# Patient Record
Sex: MALE | Race: WHITE
[De-identification: names, ages, dates, MRNs, and addresses within clinical notes are randomized per-mention and may not be internally consistent; named-entity substitution may affect disease eponyms.]

---

## 2019-03-17 ENCOUNTER — HOSPITAL ENCOUNTER (INPATIENT)
Dept: HOSPITAL 95 - ER | Age: 59
LOS: 4 days | Discharge: LEFT BEFORE DISCHARGE | DRG: 442 | End: 2019-03-21
Attending: HOSPITALIST | Admitting: HOSPITALIST
Payer: COMMERCIAL

## 2019-03-17 VITALS — WEIGHT: 308.43 LBS | HEIGHT: 70.98 IN | BODY MASS INDEX: 43.18 KG/M2

## 2019-03-17 DIAGNOSIS — I95.9: ICD-10-CM

## 2019-03-17 DIAGNOSIS — K74.60: ICD-10-CM

## 2019-03-17 DIAGNOSIS — N39.0: ICD-10-CM

## 2019-03-17 DIAGNOSIS — E66.01: ICD-10-CM

## 2019-03-17 DIAGNOSIS — E86.0: ICD-10-CM

## 2019-03-17 DIAGNOSIS — R78.81: ICD-10-CM

## 2019-03-17 DIAGNOSIS — K21.9: ICD-10-CM

## 2019-03-17 DIAGNOSIS — D69.6: ICD-10-CM

## 2019-03-17 DIAGNOSIS — K72.00: Primary | ICD-10-CM

## 2019-03-17 DIAGNOSIS — F17.210: ICD-10-CM

## 2019-03-17 DIAGNOSIS — B19.20: ICD-10-CM

## 2019-03-17 DIAGNOSIS — B95.5: ICD-10-CM

## 2019-03-17 DIAGNOSIS — E87.6: ICD-10-CM

## 2019-03-17 DIAGNOSIS — F32.9: ICD-10-CM

## 2019-03-17 LAB
ALBUMIN SERPL BCP-MCNC: 3.6 G/DL (ref 3.4–5)
ALBUMIN/GLOB SERPL: 0.6 {RATIO} (ref 0.8–1.8)
ALT SERPL W P-5'-P-CCNC: 47 U/L (ref 12–78)
ANION GAP SERPL CALCULATED.4IONS-SCNC: 9 MMOL/L (ref 6–16)
AST SERPL W P-5'-P-CCNC: 134 U/L (ref 12–37)
BASOPHILS # BLD AUTO: 0.12 K/MM3 (ref 0–0.23)
BASOPHILS NFR BLD AUTO: 1 % (ref 0–2)
BILIRUB SERPL-MCNC: 2.8 MG/DL (ref 0.1–1)
BUN SERPL-MCNC: 32 MG/DL (ref 8–24)
CALCIUM SERPL-MCNC: 9.3 MG/DL (ref 8.5–10.1)
CHLORIDE SERPL-SCNC: 105 MMOL/L (ref 98–108)
CO2 SERPL-SCNC: 28 MMOL/L (ref 21–32)
CREAT SERPL-MCNC: 0.96 MG/DL (ref 0.6–1.2)
DEPRECATED RDW RBC AUTO: 59.4 FL (ref 35.1–46.3)
EOSINOPHIL # BLD AUTO: 0.19 K/MM3 (ref 0–0.68)
EOSINOPHIL NFR BLD AUTO: 1 % (ref 0–6)
ERYTHROCYTE [DISTWIDTH] IN BLOOD BY AUTOMATED COUNT: 15.1 % (ref 11.7–14.2)
GLOBULIN SER CALC-MCNC: 6.3 G/DL (ref 2.2–4)
GLUCOSE SERPL-MCNC: 107 MG/DL (ref 70–99)
HCT VFR BLD AUTO: 45.8 % (ref 37–53)
HGB BLD-MCNC: 15.9 G/DL (ref 13.5–17.5)
IMM GRANULOCYTES # BLD AUTO: 0.04 K/MM3 (ref 0–0.1)
IMM GRANULOCYTES NFR BLD AUTO: 0 % (ref 0–1)
LYMPHOCYTES # BLD AUTO: 3.31 K/MM3 (ref 0.84–5.2)
LYMPHOCYTES NFR BLD AUTO: 24 % (ref 21–46)
MAGNESIUM SERPL-MCNC: 2.1 MG/DL (ref 1.6–2.4)
MCHC RBC AUTO-ENTMCNC: 34.7 G/DL (ref 31.5–36.5)
MCV RBC AUTO: 105 FL (ref 80–100)
MONOCYTES # BLD AUTO: 1.22 K/MM3 (ref 0.16–1.47)
MONOCYTES NFR BLD AUTO: 9 % (ref 4–13)
NEUTROPHILS # BLD AUTO: 9.05 K/MM3 (ref 1.96–9.15)
NEUTROPHILS NFR BLD AUTO: 65 % (ref 41–73)
NRBC # BLD AUTO: 0 K/MM3 (ref 0–0.02)
NRBC BLD AUTO-RTO: 0 /100 WBC (ref 0–0.2)
PLATELET # BLD AUTO: 120 K/MM3 (ref 150–400)
POTASSIUM SERPL-SCNC: 3.7 MMOL/L (ref 3.5–5.5)
PROT SERPL-MCNC: 9.9 G/DL (ref 6.4–8.2)
PROTHROMBIN TIME: 14.6 SEC (ref 9.7–11.5)
SODIUM SERPL-SCNC: 142 MMOL/L (ref 136–145)

## 2019-03-18 LAB
ALBUMIN SERPL BCP-MCNC: 3.1 G/DL (ref 3.4–5)
ALBUMIN/GLOB SERPL: 0.6 {RATIO} (ref 0.8–1.8)
ALT SERPL W P-5'-P-CCNC: 41 U/L (ref 12–78)
AMPHETAMINES UR SCN-MCNC: DETECTED NG/ML
AMPHETAMINES UR-MCNC: DETECTED UG/L
ANION GAP SERPL CALCULATED.4IONS-SCNC: 8 MMOL/L (ref 6–16)
AST SERPL W P-5'-P-CCNC: 146 U/L (ref 12–37)
BILIRUB SERPL-MCNC: 1.9 MG/DL (ref 0.1–1)
BUN SERPL-MCNC: 31 MG/DL (ref 8–24)
CALCIUM SERPL-MCNC: 8.8 MG/DL (ref 8.5–10.1)
CHLORIDE SERPL-SCNC: 108 MMOL/L (ref 98–108)
CO2 SERPL-SCNC: 29 MMOL/L (ref 21–32)
CREAT SERPL-MCNC: 0.98 MG/DL (ref 0.6–1.2)
DEPRECATED RDW RBC AUTO: 59.2 FL (ref 35.1–46.3)
ERYTHROCYTE [DISTWIDTH] IN BLOOD BY AUTOMATED COUNT: 14.9 % (ref 11.7–14.2)
GLOBULIN SER CALC-MCNC: 5.3 G/DL (ref 2.2–4)
GLUCOSE SERPL-MCNC: 88 MG/DL (ref 70–99)
HCT VFR BLD AUTO: 43.5 % (ref 37–53)
HGB BLD-MCNC: 14.8 G/DL (ref 13.5–17.5)
KETONES UR STRIP-MCNC: (no result) MG/DL
LEUKOCYTE ESTERASE UR QL STRIP: (no result)
MCHC RBC AUTO-ENTMCNC: 34 G/DL (ref 31.5–36.5)
MCV RBC AUTO: 106 FL (ref 80–100)
NRBC # BLD AUTO: 0 K/MM3 (ref 0–0.02)
NRBC BLD AUTO-RTO: 0 /100 WBC (ref 0–0.2)
PLATELET # BLD AUTO: 112 K/MM3 (ref 150–400)
POTASSIUM SERPL-SCNC: 3.4 MMOL/L (ref 3.5–5.5)
PROT SERPL-MCNC: 8.4 G/DL (ref 6.4–8.2)
PROT UR STRIP-MCNC: (no result) MG/DL
RBC #/AREA URNS HPF: (no result) /HPF (ref 0–2)
SODIUM SERPL-SCNC: 145 MMOL/L (ref 136–145)
SP GR SPEC: 1.02 (ref 1–1.02)
UROBILINOGEN UR STRIP-MCNC: (no result) MG/DL
WBC #/AREA URNS HPF: (no result) /HPF (ref 0–5)

## 2019-03-18 NOTE — NUR
PER S.O. PATIENT VOICE SOUNDING BETTER. ALERT AND ORIENTED. ONE PERSON ASSIST.
UNLABORED RESPIRATIONS. INTERMITTENT WHEEZES THAT CLEAR WITH COUGHING. IV
PATENT W/FLUIDS RUNNING. ABLE TO MAKE NEEDS KNOWN. WILL CONTINUE TO MONITOR.

## 2019-03-18 NOTE — NUR
SHIFT SUMMARY
PT WAS A NEW ADMIT DURING THE NIGHT, ADMITTED FOR HEPATIC ENCEPHALOPATHY. THE
PT WAS ADMITTED AFTER HE WAS FOUND WANDERING THE HOSPITAL AFTER HIS WIFE
DROPPED HIM OFF AT THE ER. PT IS A&O X 2-3, THOUGH CAN BE FORGETFUL, AND DOES
NOT REMEMBER WHAT HAPPENED AFTER HE ARRIVED AT THE HOSPITAL.  HE DENIED
ANY COMPLAINTS OF PAIN, NAUSEA OR SOB. PT DOES HAVE YELLOWING OF THE SCLERA,
WITH A HX OF LIVER CIRRHOSIS AND HEPATITIS C PER THE PT. VITAL SIGNS STABLE.
NO OTHER ACUTE CHANGES IN PT CONDITION NOTED. WILL CONTINUE TO MONITOR AND
TREAT PER EMAR.

## 2019-03-19 LAB
ALBUMIN SERPL BCP-MCNC: 2.7 G/DL (ref 3.4–5)
ANION GAP SERPL CALCULATED.4IONS-SCNC: 5 MMOL/L (ref 6–16)
BASOPHILS # BLD AUTO: 0.11 K/MM3 (ref 0–0.23)
BASOPHILS NFR BLD AUTO: 1 % (ref 0–2)
BUN SERPL-MCNC: 19 MG/DL (ref 8–24)
CALCIUM SERPL-MCNC: 7.7 MG/DL (ref 8.5–10.1)
CHLORIDE SERPL-SCNC: 109 MMOL/L (ref 98–108)
CO2 SERPL-SCNC: 29 MMOL/L (ref 21–32)
CREAT SERPL-MCNC: 0.8 MG/DL (ref 0.6–1.2)
DEPRECATED RDW RBC AUTO: 60.8 FL (ref 35.1–46.3)
EOSINOPHIL # BLD AUTO: 0.38 K/MM3 (ref 0–0.68)
EOSINOPHIL NFR BLD AUTO: 5 % (ref 0–6)
ERYTHROCYTE [DISTWIDTH] IN BLOOD BY AUTOMATED COUNT: 15.1 % (ref 11.7–14.2)
GLUCOSE SERPL-MCNC: 96 MG/DL (ref 70–99)
HCT VFR BLD AUTO: 40.3 % (ref 37–53)
HGB BLD-MCNC: 13.6 G/DL (ref 13.5–17.5)
IMM GRANULOCYTES # BLD AUTO: 0.01 K/MM3 (ref 0–0.1)
IMM GRANULOCYTES NFR BLD AUTO: 0 % (ref 0–1)
LYMPHOCYTES # BLD AUTO: 3.09 K/MM3 (ref 0.84–5.2)
LYMPHOCYTES NFR BLD AUTO: 37 % (ref 21–46)
MCHC RBC AUTO-ENTMCNC: 33.7 G/DL (ref 31.5–36.5)
MCV RBC AUTO: 108 FL (ref 80–100)
MONOCYTES # BLD AUTO: 0.67 K/MM3 (ref 0.16–1.47)
MONOCYTES NFR BLD AUTO: 8 % (ref 4–13)
NEUTROPHILS # BLD AUTO: 4.01 K/MM3 (ref 1.96–9.15)
NEUTROPHILS NFR BLD AUTO: 49 % (ref 41–73)
NRBC # BLD AUTO: 0 K/MM3 (ref 0–0.02)
NRBC BLD AUTO-RTO: 0 /100 WBC (ref 0–0.2)
PHOSPHATE SERPL-MCNC: 2.8 MG/DL (ref 2.5–4.9)
PLATELET # BLD AUTO: 85 K/MM3 (ref 150–400)
POTASSIUM SERPL-SCNC: 3.6 MMOL/L (ref 3.5–5.5)
SODIUM SERPL-SCNC: 143 MMOL/L (ref 136–145)

## 2019-03-19 NOTE — NUR
ALERT, ORIENTED. STEADY GAIT IN ROOM TO BATHROOM. IV PATENT. HAS NOT HAD ANY
LIQUID OR SOFT STOOLS. GOOD APPETITE. UNLABORED RESPIRATIONS. BED IN LOW
POSITION. TELE ON. WILL CONTINUE TO MONITOR.

## 2019-03-19 NOTE — NUR
PATIENT ALERT, ORIENTED. APPEARS GROGGY. HAS HAD 3 SOLID, NORMAL BOWEL
MOVEMENTS TODAY. ABLE TO MAKE NEEDS KNOWN. ONE PERSON STANDBY ASSIST. WILL
CONTINUE TO MONITOR.

## 2019-03-19 NOTE — NUR
VSS, AFEBRILE, A/O BUT DELAYED RESPONSES, WIFE AT THE BEDSIDE, IVG, DARK DKIN
ON LEGS, PMHX OF HEP C, LOW BP'S, CIRRHOSIS, KACIE/OBESE, DARK SKIN ON BILAT LE.
PT SLEEPS DEEPLY AND CAN BE DIFFICULT TO AROUSE, CYST OR WART ON THE BACK OF
THE R HAND. TELE: NSR.

## 2019-03-20 LAB
ALBUMIN SERPL BCP-MCNC: 2.8 G/DL (ref 3.4–5)
ANION GAP SERPL CALCULATED.4IONS-SCNC: 6 MMOL/L (ref 6–16)
BASOPHILS # BLD AUTO: 0.1 K/MM3 (ref 0–0.23)
BASOPHILS NFR BLD AUTO: 1 % (ref 0–2)
BUN SERPL-MCNC: 12 MG/DL (ref 8–24)
CALCIUM SERPL-MCNC: 7.9 MG/DL (ref 8.5–10.1)
CHLORIDE SERPL-SCNC: 112 MMOL/L (ref 98–108)
CO2 SERPL-SCNC: 26 MMOL/L (ref 21–32)
CREAT SERPL-MCNC: 0.78 MG/DL (ref 0.6–1.2)
DEPRECATED RDW RBC AUTO: 59.6 FL (ref 35.1–46.3)
EOSINOPHIL # BLD AUTO: 0.41 K/MM3 (ref 0–0.68)
EOSINOPHIL NFR BLD AUTO: 5 % (ref 0–6)
ERYTHROCYTE [DISTWIDTH] IN BLOOD BY AUTOMATED COUNT: 15.1 % (ref 11.7–14.2)
GLUCOSE SERPL-MCNC: 108 MG/DL (ref 70–99)
HCT VFR BLD AUTO: 42.5 % (ref 37–53)
HGB BLD-MCNC: 14.4 G/DL (ref 13.5–17.5)
IMM GRANULOCYTES # BLD AUTO: 0.03 K/MM3 (ref 0–0.1)
IMM GRANULOCYTES NFR BLD AUTO: 0 % (ref 0–1)
LYMPHOCYTES # BLD AUTO: 2.39 K/MM3 (ref 0.84–5.2)
LYMPHOCYTES NFR BLD AUTO: 31 % (ref 21–46)
MCHC RBC AUTO-ENTMCNC: 33.9 G/DL (ref 31.5–36.5)
MCV RBC AUTO: 106 FL (ref 80–100)
MONOCYTES # BLD AUTO: 0.67 K/MM3 (ref 0.16–1.47)
MONOCYTES NFR BLD AUTO: 9 % (ref 4–13)
NEUTROPHILS # BLD AUTO: 4.12 K/MM3 (ref 1.96–9.15)
NEUTROPHILS NFR BLD AUTO: 53 % (ref 41–73)
NRBC # BLD AUTO: 0 K/MM3 (ref 0–0.02)
NRBC BLD AUTO-RTO: 0 /100 WBC (ref 0–0.2)
PHOSPHATE SERPL-MCNC: 2.3 MG/DL (ref 2.5–4.9)
PLATELET # BLD AUTO: 87 K/MM3 (ref 150–400)
POTASSIUM SERPL-SCNC: 3.9 MMOL/L (ref 3.5–5.5)
SODIUM SERPL-SCNC: 144 MMOL/L (ref 136–145)

## 2019-03-20 NOTE — NUR
VSS, AFEBRILE, ALERT BUT CONFUSED, FAMILY AT THE BEDSIDE, TELE: NSR, NS AT
100, PT SLEPT WELL, NO COMPLAINTS.

## 2019-03-20 NOTE — NUR
TELE D'C AND WHEN RN WENT BACK TO GET ROOM , TELE WAS ON BED AND PATIENT WENT
OUTSIDE IN W/C W/FRIENDS. GONE FOR ABOUT 20 MINUTES.

## 2019-03-20 NOTE — NUR
PATIENT ALERT AND ORIENTED, BUT SOMETIMES DOES NOT GET DATE CORRECT. DENIES
PAIN. USES BATHROOM OR BSC. REFUSED NICOTINE PATCH PAST FEW DAYS STATING "ONLY
SMOKE COUPLE OF CIGARETTES". WENT OUT WITH FRIENDS TODAY TO SMOKE. APPEARS TO
HAVE FAIRLY STEADY GAIT VERY SHORT DISTANCES. UNLABORED RESPIRATIONS. BED IN
LOW POSITION. CALL LIGHT WITHIN REACH. WILL CONTINUE TO MONITOR.

## 2019-03-21 LAB
ALBUMIN SERPL BCP-MCNC: 2.7 G/DL (ref 3.4–5)
ANION GAP SERPL CALCULATED.4IONS-SCNC: 7 MMOL/L (ref 6–16)
BASOPHILS # BLD AUTO: 0.08 K/MM3 (ref 0–0.23)
BASOPHILS NFR BLD AUTO: 1 % (ref 0–2)
BUN SERPL-MCNC: 9 MG/DL (ref 8–24)
CALCIUM SERPL-MCNC: 7.9 MG/DL (ref 8.5–10.1)
CHLORIDE SERPL-SCNC: 110 MMOL/L (ref 98–108)
CO2 SERPL-SCNC: 26 MMOL/L (ref 21–32)
CREAT SERPL-MCNC: 0.77 MG/DL (ref 0.6–1.2)
DEPRECATED RDW RBC AUTO: 59.5 FL (ref 35.1–46.3)
EOSINOPHIL # BLD AUTO: 0.44 K/MM3 (ref 0–0.68)
EOSINOPHIL NFR BLD AUTO: 5 % (ref 0–6)
ERYTHROCYTE [DISTWIDTH] IN BLOOD BY AUTOMATED COUNT: 15.3 % (ref 11.7–14.2)
GLUCOSE SERPL-MCNC: 107 MG/DL (ref 70–99)
HCT VFR BLD AUTO: 41 % (ref 37–53)
HGB BLD-MCNC: 13.9 G/DL (ref 13.5–17.5)
IMM GRANULOCYTES # BLD AUTO: 0.02 K/MM3 (ref 0–0.1)
IMM GRANULOCYTES NFR BLD AUTO: 0 % (ref 0–1)
LYMPHOCYTES # BLD AUTO: 2.44 K/MM3 (ref 0.84–5.2)
LYMPHOCYTES NFR BLD AUTO: 30 % (ref 21–46)
MCHC RBC AUTO-ENTMCNC: 33.9 G/DL (ref 31.5–36.5)
MCV RBC AUTO: 106 FL (ref 80–100)
MONOCYTES # BLD AUTO: 0.65 K/MM3 (ref 0.16–1.47)
MONOCYTES NFR BLD AUTO: 8 % (ref 4–13)
NEUTROPHILS # BLD AUTO: 4.48 K/MM3 (ref 1.96–9.15)
NEUTROPHILS NFR BLD AUTO: 55 % (ref 41–73)
NRBC # BLD AUTO: 0 K/MM3 (ref 0–0.02)
NRBC BLD AUTO-RTO: 0 /100 WBC (ref 0–0.2)
PHOSPHATE SERPL-MCNC: 2.4 MG/DL (ref 2.5–4.9)
PLATELET # BLD AUTO: 86 K/MM3 (ref 150–400)
POTASSIUM SERPL-SCNC: 3.6 MMOL/L (ref 3.5–5.5)
SODIUM SERPL-SCNC: 143 MMOL/L (ref 136–145)

## 2019-03-21 NOTE — NUR
VSS, AFEBRILE, A/O. PT SLEPT WELL OVERNOC. OCC COUGH/GRUNTING/BRYANT BUT NO
COMPLAINTS. PT IS PLEASANT AND COOPERATIVE.

## 2019-03-21 NOTE — NUR
DISCHARGE SUMMARY
DR AT BEDSIDE THIS AM, DISCUSSING DISCHARGE PLAN WITH PT. DR DODSON
NOTIFIED THIS RN THAT SHE WOULD BE BACK TO SPEAK WITH PATEINT, DUE TO NEEDING
TO STAY ANOTHER NIGHT DUE TO BLOOD CULTURES AND ECHO ORDERED. PATIENT LEFT
ROOM STATING HE WOULD BE GOING OUTSIDE TO SMOKE WITH HIS BELONGINGS, WHEN
ASKED IF HE WAS GOING TO COME BACK, PT STATED NO. NOTIFIED SECURITY TO HOLD
PT SO WE COULD TAKE OUT IV AND HAVE HIM SIGN AMA FORM. DISCUSSED RISKS OF
LEAVING INCLUDING INCREASED CONFUSION, INFECTION AND DEATH. PT SIGNED AMA
FORM. NOTIFIED PT DAUGHTER, DERRICK, THAT PT LEFT AMA AND ENCOURAGED PT TO
HAVE HIM FOLLOW UP WITH PCP.
PT A&OX3, CONFUSED ON DATE AND CONCRETE IN THROUGH PROCESS.
 PT DENIES REPORTS PAIN IN LOWER BACK AN TENDERNESS
IN LLQ, DENIED NEED FOR MEDICATINS. PT SOB WITH EXERTION, ON RA. PT DENIES N/V
DURING SHIFT. PT RECEIVED LACTULOSE AND IV ANTIBIOTICS, DID NOT RECEIVE DOSE
TODAY PRIOR TO LEAVING AMA. IV REMOVED PRIOR TO LEAVING THE FACILITY. AMA FORM
SIGNED AND IN CHAIR. DR DODSON NOTIFIED THAT PT LEFT AMA.

## 2019-03-21 NOTE — NUR
pt left room to go to smoke, requested that he wait for family/friend to go
with, pt continued outside to smoke. will continue to monitor.

## 2020-06-11 NOTE — NUR
06/11/20 0721 La Kingsley
PT. VERBALIZES HAVING SOME CRAMPING IN HIS ABD. PT.ENC. TO PASS OUT
AIR IF NEEDED BUT EVENTUALLY IT WOULD ABSORB. PT. VERBALIZES CRAMPING
IS TOLERABLE.

## 2020-09-29 NOTE — NUR
09/29/20 1532 La Kingsley S
AUDIBLE WHEEZE AFTER MOVING UP IN BED. PT. VERBALIZES NO SORE THROAT
OR PAIN BESIDES HIS BACK PAIN IN WHICH HE CAME IN WITH. PT. VERBALIZES
JUST FEELS LIKE PHLEGM IN THE BACK OF HIS THROAT. PT. DRINKING APPLE
JUICE WITHOUT PROBLEMS. PT. REMINDED TO STAY ON A SOFT DIET TODAY
SINCE HE HAD THE BANDING DONE BUT COULD RESUME REG. DIET TOMORROW. PT.
AGREED.

## 2020-11-24 NOTE — NUR
11/24/20 1051 Bennie Rob
FIRST IV IN RIGHT HAND MISSED
SECOND IV IN RIGHT FOREARM MISSED
THIRD IV IN LEFT HAND WORKED

## 2021-05-14 NOTE — NUR
PT TO ICU 13 FROM ED AT 1720. PT ALERT TO SELF, FOLLOW COMMANDS BUT IS
IMPULSIVE PULLING AT LINES/CORDS. PT TRANSFERRED TO ICU BED USING SLIDER SHEET
AND BED ALARM SET. PT GIVEN WARM BLANKETS AT HIS REQUEST AND PT IMMEDIATELY
BECOMES MUCH MORE CALM AND COOPERATIVE WITH CARE. PT STANDS AT BED FOR VERY
SHORT PERIOD OF TIME TO ATTEMPT USING URINAL, PT EASILY TIRES AND REQUESTS TO
GET BACK IN BED. PT EXHIBITS GENERALIZED WEAKNESS BUE/BLE. PT UNABLE TO
PROVIDE URINE SAMPLE AT THIS TIME, DISCUSSED POSSIBILITY OF LANGSTON CATH, PT
REFUSES AT THIS TIME. PT'S ABDOMEN SEVERELY FIRM AND DISTENDED, RIGHT ABDOMEN
WITH DRESSING COVERING PUNCTURE, PT STATES HE HAD "10 L REMOVED FROM HIS
ABDOMEN" YESTERDAY VIA PARACENTESIS AT [facility]. PT'S SATS DROP TO HIGH
80'S WHEN SLEEPING, PLACED ON 2LNC TO MAINTAIN SATS>90%.
PROTONIX GTT @ 10 ML/HR, OCTREOTIDE GTT @ 25 ML/HR, NS @ 50 ML/HR. ALL VSS.
WILL REPORT TO ONCOMING NURSE.

## 2021-05-14 NOTE — NUR
05/14/21 2245 MARCY VARGAS
History, Chart, Medications and Allergies reviewed before start of
procedure. 3-LEAD EKG REVIEWED WITH PHYSICIAN PRIOR TO START OF
PROCEDURE. MONITOR INTACT WITH CONTINUOUS PULSE OXIMETRY AND
INTERMITTENT BP. MONITOR INTACT WITH CONTINUOUS PULSE OXIMETRY AND
INTERMITTENT BP. MAC WITH DR. URBANO.

## 2021-05-15 NOTE — NUR
NO CHANGES FOLLOW PREVIOUS NOTE. PT REMAINS IN BILATERAL SOFT WRIST RESTRAINTS
TO PROTECT LINES/CORDS. PT RESTING COMFORTABLY ON 0.3 MCG/KG/HR PRECEDEX.
SANDOSTATIN @ 25 ML/HR, PROTONIX GTT @ 10 ML/HR. NO CHANGE IN NEUROSTATUS. 300
DARK JOHN URINARY OUTPUT. 400 ML (LACTULOSE AMOUNT DEDUCTED) WATERY BLOODY
BROWN STOOL IN RECTAL TUBE. PT'S SATS REMAIN>90% ON 2LNC. PT OCCASIONALLY
HYPOTENSIVE, WILL PASS OFF TO NOC NURSE TO CONSIDER ALBUMIN FOR TX OF
HYPOTENSION RATHER THAN FLUID BOLUS. PT'S DAUGHTER UPDATED WITH PT'S STATUS
AND PLAN OF CARE, STATES UNDERSTANDING AND PLANS TO VISIT TOMORROW.
WILL REPORT TO ONCOMING NURSE.

## 2021-05-15 NOTE — NUR
DR. POLANCO AT BEDSIDE ASSESSING PT. RELAYED CONVERSATION WITH DAUGHTER TO
LIANET POLANCO AND KATHIE. NAPOLEON CALLED AND UPDATED WITH PT STATUS.

## 2021-05-15 NOTE — NUR
ASSUMED CARE AT 1900
PT LAYING IN BED AND IS REACTIVE TO PERSONAL CARE AND REPOSITIONING; PT DOES
NOT FOLLOW DIRECTIONS; PT MOANS DURING STIMULATION AND SPONTANIOUSLY; PT NOT
PULLING ON RESTRAINTS BUT DOES MOVE BUE DURING REPOSITIONING; GAG AND COUGH
PRESENT. SPO2 >95% ON 2L NC. SINUS MACIEL WITH RATE IN 50'S. SBP 80-90, MAP
>65. MODERATE ABD DISTENTION NOTED BUT ABD SOFT. RECTAL TUBE IN PLACE; LOOSE
WET STOOLS NOTED, Q6HR LACTULOSE. LANGSTON IN PLACE DRAINING DARK YELLOW URINE.
DRESSING TO RT LATERAL ABD C/D/I DUE TO PARACENTESIS EARLIER TODAY. PICC LINE
TO JALEN AND POWER GLIDE TO JANNA BOTH PATENT AND DRAWING. PRECEDEX INFUSING AT
0.3MCG/KG/HR. PROTONIX, OCTRIOTIDE, AND NS INFUSING. SEE SHIFT ASSESSMENT FOR
FULL ASSESSMENT.

## 2021-05-15 NOTE — NUR
1200 UPDATE
RECTAL TUBE PLACED AND RESTRAINTS APPLIED DUE TO PULLING AT LINES AND CORDS.
PT INCREASE IN CONFUSION AND NOT MAKING FULL SENTENCES. PT NOT FOLLOWING
DIRECTIONS AND MINIMALLY RESPONDS TO PT NAME. PT RESTLESS AND DOES NOT
TOLERATE PERSONAL CARE WELL. 10L FACE MASK, WILL START TO TITRATE DOWN. SBP
100-120, MAP >65. WILL CONT TO MONITOR.

## 2021-05-15 NOTE — NUR
UPDATE
CALLED DR VÁZQUEZ REGARDING SBP 70-90, AND MAP 50-70'S WITH PRECEDEX ON SB.
NEW ORDER FOR LEVOPHED PROVIDED AND PARAMETERS THAT A SBP 90 OR GREATER, AND A
MAP OF 60 OR GREATER IS OKAY.

## 2021-05-15 NOTE — NUR
PT'S DAUGHTER CALLS WITH INFORMATION REGARDING PT'S "LIVER TEAM" AT [facility].
DAUGHTER STATES SHE WOULD LIKE A "DOCTOR TO DOCTOR" CONSULT WITH PT'S [facility]
DOCTOR (EMRE RUDOLPH [phone]) FOR A SECOND OPINION. PT REMAINS STABLE.
PICC LINE PLACED TO JALEN. PT REMAINS VERBALLY UNRESPONSIVE, MOANS, FAILS TO
FOLLOW COMMANDS, CONTINUES TO PULL AT RESTRAINTS AND LINES (BILATERAL SOFT
WRIST RESTRAINTS IN PLACE).  RECTAL TUBE REMAINS PATENT AND DRAINING DARK
BLOODY LIQUID STOOL. FOLLOWING ADMINISTRATION OF ENULOSE VIA RECTAL TUBE PT
WAS NOTED TO HAVE "BLOODY STOOL" SEEPING AROUND RECTAL TUBE. PT'S HEMOGLOBIN
REMAINS STABLE AT THIS TIME. NO EVIDENCE OF NEW OR WORSENING BLEEDING. PT'S
ABDOMEN SOFTER AND LESS DISTENDED FOLLOWING REMOVAL FOR 1750 ML DURING
PARACENTESIS.  PRECEDEX REMAINS INFUSING AT 0.3 MCG/KG/HR. BP REMAINS WNL WITH
OCCASIONAL EPISODES OF HYPOTENSION. PT AFEBRILE. HR 60'S-70'S, IRREGULAR.

## 2021-05-15 NOTE — NUR
SPOKE WITH PT'S DAUGHTER NAPOLEON THIS AM REGARDING PT'S PROCEDURE YESTERDAY
EVENING, DR. POLANCO'S RECOMMENDATIONS AND PLAN FOR PARACENTESIS TODAY. PT
CONSENTS TO PROCEDURE AND STATES THAT HER "LIFE DOESN'T REVOLVE AROUND THE
PHONE SO DON'T HOLD UP A PROCEDURE BECAUSE YOU CAN'T GET AHOLD OF ME".
DISCUSSED PT'S DECLINE THIS AM AND OPTIONS MOVING FORWARD. PT'S DAUGHTER
CONSIDERING HOSPICE SO THAT PATIENT CAN "COME HOME AND FISH" BUT WANTS FULL
CARE FOR PT AT THIS TIME. PALLIATIVE CARE UPDATED. PT CONTINUES TO BE VERBALLY
UNRESPONSIVE, MOANING, UNABLE TO FOLLOW COMMANDS. PT HYPOTENSIVE PERIODICALLY.
PT APPEARS MOST COMFORTABLE IN LEFT LATERAL POSITION. REMAINS IN RESTRAINTS TO
PREVENT PULLING VITAL CORDS.

## 2021-05-15 NOTE — NUR
DR. LOZANO AT BEDSIDE COMPLETING PARACENTESIS. 1750 ML OFF ABDOMIN. PT
CURRENTLY ON 0.2 MCG/KG/HR PRECEDEX D/T INCREASED AGITATION CAUSING INCREASED
WORK OF BREATHING. PT TOLERATING PRECEDEX WELL, APPEARS MORE COMFORTABLE, 02
94% ON RA, BP WNL. PT REMAINS IN RESTRAINTS TO PROTECT CORDS.

## 2021-05-15 NOTE — NUR
ASSUMPTION OF CARE
ASSUMED CARE OF PT AT THIS TIME. PT WEARING PROCEDURAL OXYGEN MASK AT 7LPM
WITH BIOX >95%. RECEIVING PRECEDEX 0.7MCG/KG/HR, PROTONIX 10MLS/HR, OCTREOTIDE
25MLS/HR. PT IN BILAT SOFT WRIST RESTRAINTS. RECTAL TUBE IN PLACE DRAINING
BLOOD-TINGED STOOL. LANGSTON PATENT WITH MINIMAL JOHN URINE.
TURNED PRECEDEX OFF AT THIS TIME DUE TO HYPOTENSION. REPOSITIONED PT AND
APPEARS MUCH MORE COMFORTABLE. OXYGEN MASK REPLACED WITH NC AT 2L. PLAN FOR
ABDOMINAL US FOLLOWED BY PARACENTESIS. DR PARADA AT BEDSIDE FOR EVAL. DISCUSSED
STARTING IV ATIVAN FOR AGITATION. SEE SHIFT ASSESSMENT.

## 2021-05-15 NOTE — NUR
0400 UPDATE
LANGSTON INSERTED, PT NOT COMMUNICATING THAT HE NEEDS TO URINATE, PT CONT TO BE
RESTLESS AND AGITATED WITH PERSONAL CARE. PT CONT TO MOAN AND CRY OUT, PULLING
AT RESTRAINTS AND TRYING TO GET OUT OF BED. DR RECIO NOTIFIED OF BEHAVIORS
AND PROVIDED ORDERS FOR PRN HALDOL AND IF NOT HELPFUL, PROTONIX GTT MAY BE
STARTED. PRN HALDOL GIVEN AND HELPFUL FOR 10 MIN AND THAN PT RESUMED PREVIOUS
BEHAVIOR, PROTONIX ORDERED AND PLAN TO START. ABLE TO TITRATE DOWN ON O2 TO
9L. WILL CONT TO MONITOR.

## 2021-05-15 NOTE — NUR
ASSUMED CARE AT 1900
PT LAYING IN BED AND IS ALERT TO SELF AND PLACE BUT DOES NOT FOLLOW DIRECTION
EVEN WHEN HE STATES THAT HE IS; PT CAN BE REDIRECTABLE BUT CHALLENGING DOING
PERSONAL CARE; 4 NURSES NEEDED TO PLACE IV'S DUE TO PT NOT FOLLWOING
DIRECTIONS. SPO2 >90% ON 2L NC. HR 80'S. SBP 90'S, DR REED CALLED AND
NOTIFIED ABOUT MAPS <65, NEW ORDER FOR 500ML NS BOLUS PROVIDED AND STARTED.
ABD SEVERELY DISTENDED. PT ABLE TO COMMUNICATE THAT HE NEEDED TO URINATE, UA
SENT. PUNCTURE WOUND TO RT LATERAL ABD FROM PREVIOUS PARACENTESIS C/D/I.
PROTONIX, OCTREOTIDE, AND NS INFUSING. SEE SHIFT ASSESSMENT FOR FULL
ASSESSMENT.

## 2021-05-15 NOTE — NUR
END OF SHIFT SUMMARY
PT REACTIVE TO VERBAL STIMULI AND HAS NOT BEEN ABLE TO ANSWER QUESTIONS
APPROPRIATLY AS THE SHIFT CONT; PRECEDEX INFUSING AT 0.5MCG/KG/HR. SPO2 >90%
ON 7L VIA FACEMASK. AFEBRILE. HR 60-80'S. ABD STILL SEVERLY DISTENDED; RECTAL
TUBE IN PLACE AND DRAINING DARK RED/BROWN STOOL THAT IS LIQUID; PROTONIX AND
OCTRIOTIDE INFUSING. LANGSTON PLACED AND DRAINING DARK YELLOW URINE. REPORT GIVEN
TO TRACY WEAVER.

## 2021-05-16 NOTE — NUR
ASSUMED CARE AT 1900
PT LAYING IN BED AND IS MORE ALERT BUT SLOW TO RESPOND; PT STATED NAME/ BU
DIDN'T KNOW PLACE/TOWN; RN INFOMRED PT AND COMPLETED ASSESSMENT, AFTERWARDS PT
WAS ABLE TO RESTATE LOCATION/TOWN WHEN ASKED AGAIN; PT AWAKE FOR SHORT PERIODS
AND THAN FALLS BACK ASLEEP; PRECEDEX INFUSING AT 0.4MCG/KG/HR. SMALL AMOUNT OF
SECREATIONS COUGHED UP, SPO2 >95% ON 2L NC. AFEBRILE. HR 50-60'S. SBP ,
MAP >60, LEVOPHED INFUSING AT 10MCG/MIN. ABD MOD-SEVER DISTENDED; RECTAL TUBE
IN PLACE AND DRAINING BLOOD TINGED, WET, LOOSE STOOL; LACTULOSE Q6HR. LANGSTON
PATENT AND DRAINING DARK MABER URINE. RT LATERAL ABD DRESSING HAS MINIMAL
DRAINAGE. PROTONIX AND OCTRIOTIDE INFUSING, PICC TO JANNA AND POWERGLIDE TO JALEN
PATENT AND DRAWING. SEE SHIFT ASSESSMENT FOR FULL ASSESSMENT.

## 2021-05-16 NOTE — NUR
ORAL CARE HAS BEEN PERFORMED MULTIPLE TIMES TO REMOVE DRIED BLOOD FROM ROOF OF
MOUTH AND TONGUE. MINIMAL SECRETIONS, NO SIGNS OF ACTIVE ORAL BLEEDING.
PARACENTESIS SITES ON R ABD DRAINING SEROSANGUANOUS FLUID. DRESSING THAT WAS
PLACED BY NOC SHIFT BECAME SATURATED. REPLACED DRESSING. BRUISING AROUND SITE
HAS ENLARGED SINCE PREVIOUS DAY. OUTLINED BRUISED AREA TO MONITOR FOR GROWTH
OR CHANGES. DR POLANCO AT BEDSIDE FOR EVAL. DISCUSSED ECCHYMOSIS TO R ABD,
DEFERRED TO DISCUSS WITH DR GRAY WHEN HE ROUNDS. NO ADDITIONAL ORDERS AT
THIS TIME. LEVOPHED INFUSING AT 10MCG/MIN, PRECEDEX AT 0.4MCG/KG/HR. REMAINS
ON 2L VIA NC. WILL REPORT TO OSITO WEAVER.

## 2021-05-16 NOTE — NUR
SHIFT SUMMARY
Pt continues to sleep throughout the afternoon and mumbles at times. He
remains on 2LPM via NC. Jack is patent and is putting out dark edy urine.
His rectal tube is in place but does mildly leak around the insertion site.
His IV meds/fluids continue to infuse as ordered. BP has been stable and he
continues on levophed. Pt's daughter came by for an update but did not stay
for more than a few minutes. He has a hematoma to his right abdomen that is
leaking serous fluids s/p paracentesis, the absorbent dressing was changed
this hiren. Pt has been repostioned and has his call light in reach. No acute
changes observed, will give report to oncoming RN.

## 2021-05-16 NOTE — NUR
END OF SHIFT SUMMARY
PT RESPONSIVE TO PAINFUL STIMULI AND REPOSITIONING, WHEN NOT BEING STIMULATED
PT RESTING COMFORTABLY. WHEN STIMULATED, PT MOANS AND GRONES AND IS VERY
RESISTANT TO CARE; ATIVAN GIVEN X2 DUE TO INCREASED RESTLESSNESS AND
AGITATION; PRECEDEX INFUSING AT 3MCG/KG/HR. SPO2 >95% ON 2L NC, AFTER
AGITATION AND STIMULATION FROM REPOSITIONING, PT HAS EXPIRATORY WHEEZES THAT
GO AWAY. AFEBRILE. HR 50-70'S. SBP , MAP >60, LEVOPHED INFUSING AT
4MCG/MIN. ABD MOD-SEVERE DISTENTION, SOFT TO PALPATE; RECTAL TUBE IN PLACE AND
DRAINING DARK RED LIQUID STOOL; LACTULOSE Q6HR. LANGSTON PATENT AND DRAINING TO
GRAVITY DARK YELLOW/TEA URINE. RT LATERAL ABD SITE WHERE PARACENTESIS
PERFORMED YESTERDAY WEEPING; DRESSING CHANGED TWICE DUE TO DRESSING BEING
SATURATED; WOUND DRAINING SEROSANGINEOUS FLUID. PROTONIX AND OCTRIOTIDE
INFUSING. PICC TO JALEN AND POWER GLIDE TO JANNA PATENT AND DRAWS. REPORT GIVEN TO
TRACY WEAVER.

## 2021-05-16 NOTE — NUR
Assumed care of this patient. H is resting in bed with his eyes closed. He
mumbles random words at times and per report that has been his baseline. MAP
remains above 60. current BP is 98/66. joyner is patent with dark edy output.
Oral care was done. Restraints in place as ordered, skin is intact with good
pulses under the wrist restraints. IV meds/fluids infusing as ordered. O2 at 2
LPM via NC and his SAT is 96. He was re-positioned. Bed alarm is on for pt
safety.

## 2021-05-16 NOTE — NUR
ASSUMPTION OF CARE
ASSUMED CARE OF PT AT THIS TIME. PT IS CURRENTLY SLEEPING, APPEARS
COMFORTABLE. PT REMAINS ON 2L VIA NC WITH BIOX >96%. PT RECEIVING LEVOPHED
5MCG/MIN, PRECEDEX 0.3MCG/KG/HR, OCTREOTIDE 25MLS/HR, AND PROTONIX 10MLS/HR.
LANGSTON IN PLACE WITH JOHN URINE. RECTAL TUBE DRAINING BLOOD-TINGED STOOL. SEE
SHIFT ASSESSMENT.

## 2021-05-17 NOTE — NUR
REASSSESSMENT
 
PT ALERT ORIENTED, SLEEPING INTERMITTANTLY. RECTAL TUBE REMOVED FOR COMFORT.
ALL GTTS STOPPED PER COMFORT CARE ORDERS OTHER THAN LEVOPHED. LEVOPHED
TITRATED DOWN. LANGSTON REMAINS TO GRAVITY DRAINAGE. PT ABLE TO HELP REPOSITION
IN BED.

## 2021-05-17 NOTE — NUR
ASSUMED CARE OF PT. PT ALERT, ORIENTED TO PERSON AND PLACE, FOLLOWING
COMMANDS. ON 2L OF O2 VIA NC TO MAINTAIN SPO2 >92%. LEVOPHED AT 10MCG/MIN,
PRECEDEX AT 0.3MCG/KG/HR, OCTREOTIDE AT 25ML/HR, AND PROTONIX AT 10ML/HR.
LANGSTON TO GRAVITY DRAINAGE, JOHN URINE. RECTAL TUBE IN PLACE TO GRAVITY
DRAINING YELLOW/CLEAR, RECEIVING Q6 LACTLOSE ENEMAS. PICC LINE TO JALEN AND
POWERGLIDE TO LEFT UPPER ARM. SINUS RHYTHM ON THE MONITOR.

## 2021-05-17 NOTE — NUR
SHIFT SUMMARY
PT COMFORT CARE, ALERT AND ORIENTEDx4. ATTEMPTED TO TITRATE LEVOPHED OFF.
INCREASED DOSE OF MIDORINE GIVEN TO ASSIST IN TITRATION OFF PER DR SABILLON
ORDERS. PRN PAIN MEDICATION GIVEN FOR COMFORT, SEE EMAR FOR DOSING. PT
DECLINES O2 FOR COMFORT. RECTAL TUBE REMOVED TODAY FOR PT'S COMFORT. PT
CONTINUES TO HAVE LOOSE BM'S. ATTENDS IN PLACE. LANGSTON TO GRAVITY DRAINAGE, DK
JOHN URINE. PICC POSITIONAL AT TIMES, DEPENDANT ON PT POSITION.

## 2021-05-17 NOTE — NUR
PT C/O PAIN 9/10 TO NECK. PT COMFORT CARE. DAUGHTER AT BEDSIDE. PT MEDICATED
WITH ROXINAL SL 5MG FOR PAIN, PER PRIMARY RN.

## 2021-05-17 NOTE — NUR
END OF SHIFT SUMMARY
PT IS MORE ALERT/ORIENTED X3, IS ABLE TO STATE NAME//TOWN/BUILDING/YEAR;
PRECEDEX INFUSING AT 0.3MCG/KG/HR. PT MAKING STATESMENTS THAT HE IS "READ[place] TO
SEE [place]" AND THAT HE WANTS TO TALK TO HIS DAUGHTER BEFORE HE "JOINS
NAPOLEON'S (DAUGHTER) MOTHER [facility]". PT DID NOT WANT TO TALK ANYMORE ABOUT
COMFORT CARE UNTIL HE WAS ABLE TO SEE HIS DAUGHTER. AFEBRILE. SPO2 >95% ON 2L
NC. HR 60'S. SBP , MAP >60, LEVPHED INFUSING AT 10MCG/MIN. RECTAL TUBE
PATENT AND DRAINING CLEAR/YELLOW FLUID THAT IS SIMILAR TO LACTULOSE FLUID
COLOR. LANGSTON PATENT AND DRAINING DARK JOHN/RED URINE. DRESSING TO RT LATERAL
ABD CHANGED ONCE DUE TO SATURATION. PROTONIX, OCTRIOTIDE, AND NS INFUSING.
PICC TO JANNA AND POWERGLIDE TO JANNA PATENT AND DRAWS. REPORT GIVEN TO JUANITO WEAVER.

## 2021-05-17 NOTE — NUR
ASSUMPTION OF CARE
PT RESTING COMFORTABLY IN BED, MORPHINE GIVEN AT SHIFT CHANGE FOR SHOULDER
PAIN. PT'S DAUGHTER ARRIVED AT BEDSIDE. PT HAS COMFORT CARE ORDERS WITH THE
EXCEPTION OF LEVOPHED INFUSING PER DAUGHTER'S REQUEST. LEVOPHED CURRENTLY AT
5MCG WITH MAP GOAL >60 AND SBP >90. SR ON MONITOR, HR 60-70'S. SPO2 >90% ON
RA, NC AT BEDSIDE FOR COMFORT. PT ABLE TO MOVE SELF IN BED.

## 2021-05-17 NOTE — NUR
pt prognosis is poort kps score 20%. pt want comfort only daughter notified
and came in to visit. pt remains on pressors. plan is no further excaltion of
pressors per Dr. Crenshaw. We will try to titrate of he is on midodrine. Family
want to try and get him home as per his wishes. They do not have a preferance
on agency. Review of pt with doctor abrio and [facility]. Will follow
to see if he can tolerate move home.

## 2021-05-17 NOTE — NUR
REASSESSEMENT
PT ALERT AND ORIENTEDx4, DAUGHTER AT BEDSIDE. PT EXPRESSING WISHES TO GO HOME
AND STOP AGGRESSIVE CARE. CELIA FROM PALLITIVE CARE AT BEDSIDE DISCUSSING
OPTIONS WITH DAUGHTER AND PT. CURRENT PLAN IS TO TRY AND TITRATE PT OFF OF
VESSOPRESSORS AFTER STARTING MIDODRINE. PT NOW DNR AND WILL TRANSITION TO
COMFORT CARE. CLEAR LIQUID DIET STARTED AND PT TOLERATING WELL. PRECEDEX GTT
STOPPED MID MORNING AND PT APPEARS TO BE TOLERATING BEING OFF GTT. PT
TOLERATES ROOM AIR WHEN AWAKE, 2L NC WHEN SLEEPING. RECTAL TUBE AND LANGSTON
BOTH TO GRAVITY IN PLACE.

## 2021-05-18 NOTE — NUR
Spiritual care note:
 
Met with Wilma larios, at bedside.  She spoke about the loss of her mom 5
months ago in this hospital and how difficult that has been on her dad. Wilma
appears to have accepted POC and tells me she is hopeful Jose will be able to
make it home with hospice.  I complimented Wilma on her strength and capacity
for love.  I will continue to monitor Jose's progress and offer prayer/
to family.

## 2021-05-18 NOTE — NUR
MORNING ASSESSMENT
PATIENT RESTING IN BED. WAKES SPONTANEOUSLY. LEVOPHED TITRATED DOWN. NO FAMILY
AT BEDSIDE. PATIENT DENIES ANY CONCERNS OR NEEDS. NEW DRESSING APPLIED TO
DRAINING PARACENTESIS SITE.

## 2021-05-18 NOTE — NUR
Arrival to unit
Received report from Macey ICU RN. Patient arrived with personal belongings
and dentures. Transferred over with 4p slide. Stat lock placed to L leg.
Following commands. Eyes appear rolled back upon arrival, but patient
responsive to verbal commands. On RA. No family present. Jack patent and
draining dark edy urine. Bed in lowest position, call light near, bed alarm
on. Patient states "I just want to sleep." Sclera is yellow. Report given to
noc RN.

## 2021-05-18 NOTE — NUR
Review of family needs for in home wi care with hospice liason. will
continue supportive visits with daughter.

## 2021-05-18 NOTE — NUR
DAUGHTER AT BEDSIDE. LEVOPHED TITRATED OFF. DISCUSSED HOSPICE MEETING WITH
DAUGHTER. DISCUSSED POSSIBLE TRANSFER OUT OF ICU. PATIENT SLEEPING AT THIS
TIME, NO CONCERNS FROM FAMILY.

## 2021-05-18 NOTE — NUR
SHIFT SUMMARY
PATIENT RESTING QUIETLY THROUGHOUT SHIFT. DRESSING TO ABDOMEN CHANGED,
SEROSANGANOUS DRAINAGE FROM PARACENTESIS SITE. PT ABLE TO REPOSITION SELF IN
BED WITH HELP FROM STAFF. PATIENT WAS TITRATED OFF LEVOPHED AND BP HAS BEEN
STABLE. DENIES PAIN OR SOB. HOSPICE WAS CONSULTED WITH DAUGHTER AT THE
BEDSIDE. DAUGHTER AND FAMILY REMAIN AT BEDSIDE FOR MAJORITY OF SHIFT.
 
TRANSFER
REPORT GIVEN TO OWEN IBRAHIM. PATIENT TO TRANSFER VIA BED TO ROOM 327.

## 2021-05-18 NOTE — NUR
ASSUMPTION OF CARE. IBRAHIMA IS SLEEPING COMFORTABLY IN BED. NO SIGN OF DISTRESS.
BED ALARM IS ON. CALL LIGHT IS IN REACH.

## 2021-05-18 NOTE — NUR
ASSUMPTION OF CARE. SLEEPING OFF AND ON. DID WAKE WHEN HE GOT A VISITOR, THINK
IT WAS HIS SON AS I DID NOT CATCH THE NAME. HE WAS ASKING FOR [facility] PAPER WORK
TO BE SIGNED FOR HIS WORK. THAT WAY HE CAN STAY HOME AND TAKE CARE OF IBRAHIMA.
INFORMED HE WILL HAVE TO HAVE A MD SIGN THE PAPERWORK. GAVE LACTOLOSE. DENIED
ANY PAIN OR DISCOMFORT. CALL LIGHT REMAINS IN REACH, WILL CONTINUE TO MONITOR.

## 2021-05-18 NOTE — NUR
SHIFT SUMMARY
PT RESTING IN BED, LEVOPHED STILL INFUSING AT 5 MCG TO MAINTAIN MAP ABOVE 60
AND SBP GREATER THAN 90. DRESSING TO ABD CHANGED THIS SHIFT, SEROSANGANOUS
FLUID DRAINING FROM PARACENTESIS SITE. PT ABLE TO MINIMALLY REPOSITION SELF
THROUGHOUT SHIFT. DENIED PAIN MAJORITY OF SHIFT, MEDICATED WITH MORPHINE PER
EMAR WHEN REPORTING PAIN.

## 2021-05-18 NOTE — NUR
STUDENT NURSE NOTES AND DOCUMENTATION REVIEWED. WORKED WITH STUDENT NURSE
FRANCO TODAY. AGREE WITH ASSESSMENTS AND DOCUMENTATIONS.

## 2021-05-19 NOTE — NUR
SHIFT SUMMARY: IBRAHIMA SLEPT MOST OF THE SHIFT TILL THIS MORNING WHEN HE TOSSED
AND TURNED, VERY RESTLESS AND COMPLAINING OF PAIN. GAVE HIM 4MG OF MORPHINE
WHICH HELPED TO RELIEVE IT. DID HAVE BM ON COMMODE LAST NIGHT. LANGSTON ORANGE
URINE, LITTLE SEDIMENT. ABDOMEN STILL VERY DISTENDED FIRM. DRESSING ON RIGHT
SIDE CDI. BRUISING ALL OVER, JAUDICE. BILATERAL WHEEZES. NO OTHER CHANGES TO
REPORT. CALL LIGHT IN REACH.

## 2021-05-19 NOTE — NUR
PATIENT HAS BEEN SLEEPING, AWAKES OFF AND ON. DENIES ANY PAIN OR DISCOMFORT.
WILL CONTINUE TO MONITOR.

## 2021-05-19 NOTE — NUR
SHIFT SUMMARY
 
PATIENT MEDICATED X3 FOR PAIN/AIR HUNGER. MEDICATED X1 FOR ANXIETY. PATIENT
APPEARS DYSPENIC AT TIMES, 3L NC FOR COMFORT AND REPOSITIONED AS NEEDED.
PATIENT UP IN CHAIR FOR BREAKFAST AND LUNCH. LANGSTON PATENT AND DRAINING DARK
JOHN URINE. PATIENT HAD SEVERAL VISITORS TODAY.

## 2021-05-19 NOTE — NUR
ASSUMPTION OF CARE. IBRAHIMA WOKE TO VERBAL STIMULI. STATES PAIN "OK" AT THIS
MOMENT. AUDITORY WHEEZES NOTED, O2 NC ON FOR COMFORT. ABDOMIN VERY DISTENDED,
FIRM IN SOME PLACES. BLE LEGS STILL VERY DRY WITH SCAB ON THE RIGHT CALF. SKIN
IS ROUGH WITH SOME SMALL BUMPS ON BUE. BRUISING SCATTERED T/O, LARGE SIZE ONE
ON THE RIGHT CHEST/ABDOMIN. WILL CONTINUE TO MONITOR AND PROVIDE COMFORT. none

## 2021-05-19 NOTE — NUR
Spiritual care note:
 
No family present this afternoon when I visited.  Jose awakens to voice.
Speech is muddled.  He asked for water and I provided this.  I provided
audible prayer and comfort through touch. I will remain available to pt and
family.

## 2021-05-19 NOTE — NUR
Pt porgressing review with nursing prn meds and patients opimum positoning
comfort. will continue to monitor for air hunger and symptoms.

## 2021-05-20 NOTE — NUR
ASSUMPTION OF CARE. IBRAHIMA IS SLEEPING, BERATHING BETTER THEN THIS MORNING. DID
NOT WANT TO WAKE UP TO HIS NAME. VERY JAUDICE. ABDOMIN WEEPING FROM RIGHT SIDE
WHERE PARACENTESIS WAS PERFORMED. CATHETER IS STILL PATENT. LUNGS DIMINISHED.
BED ALARM IS ON. WILL CONTINUE TO MONITOR AND TREAT.

## 2021-05-20 NOTE — NUR
ORAL CARE PROVIDED WITH SUCTION. THERE IS A FLAP OF SKIN FROM THE TOP OF HIS
MOUTH THAT HANGS DOWN, LIKE IT HAS BEEN PEELED BACK. TOOK OUT HIS DENTURS. DID
SOME SUCTION. HE WAS NOT REACTING TO THE SUCTION OR ORAL CARE. ROXINAL GIVEN.
REPOSITIONED. HE JUST GROANS AND GRUNTS, OCCATIONAL COUGH, DEAD WEIGHT WHEN
MOVING HIM. AIR HUNGER STILL PRESENT. OCCAITONAL APNEIC PERIODS NOTED. BED
ALARM IS ON.

## 2021-05-20 NOTE — NUR
Case Conference Note
 
Spoke with  Kiara who reports concerns regarding Pt's comfort and
repiratory distress.
 
Reviewed chart and discussed case with Scottie RN and nursing student. Bedside
RN reports Pt's symptoms are currently managed. Reviewed comfort medications
and offered suggestions if symptoms become unmanaged.
 
Palliative Care will remain available.

## 2021-05-20 NOTE — NUR
SHIFT SUMMARY: IBRAHIMA HAS BEEN SLEEPING WITH SOME RESTLESSNESS T/O THE NIGHT.
DIFFICULTY BREATHING DEEP, AUDIABLE WHEEZING. ABDOMIN VERY DISTENDED AND FIRM.
O2 WAS INCREASED TO 5 LITERS FOR COMFORT. GAVE MORPHINE FOR AIR HUNGER. USES
ABDOMINAL BREATHING AND DOES BETTER SITTING UP. LACTOLOSE NOT GIVEN AS HE WAS
NOT AWAKE ENOUGH TO SWALLOW IT. UNABLE TO CLEARLY UNDERSTAND HIM 90% OF THE
TIME, TALKS TO SELF, AND UNABLE TO FOCUS. NOT EVEN SURE HE IS UNDERSTANDING
WHAT IS BEING SAID TO HIM. EYES ARE CONSTANTLY ROLLED BACK. SOME CONFUSION.
GETTING WEAKER. WILL REPORT TO DAY SHIFT. CALL LIGHT IS IN REACH.

## 2021-05-20 NOTE — NUR
SHIFT SUMMARY
 
PATIENT FREQUENTLY MEDICATED FOR PAIN//AIR HUNGER. MEDICATION FREQUENCY
INCREASED TO ALMOST HOURLY IN AFTERNOON. REPOSITIONED LEFT SIDE FOR COMFORT.
LEVEL OF CONSCIOUSNESS DECREASED THIS SHIFT. PATIENT RESPONSIVE TO VERBAL
STIMULI, BUT ONLY ABLE TO GIVE SHORT RESPONSES. LELAUGTHER VISITED BRIEFLY IN
AFTERNOON.

## 2021-05-20 NOTE — NUR
Spiritual care note:
 
Mr. Delvalle had been medicated @30 min before I visited.  he still
appeared restless and working hard to breathe.  I asked palliative care RN to
look into med change.  No family present at time of visit.  Mr. Delvalle
was confused and did not respond directly to me.   services will
remain available.

## 2021-05-20 NOTE — NUR
[facility] comfort care case conference with RN. Pt's primary s/s is work of
breathing/dyspnea due to ascites. Reviewed rxs per emar available and being
used. Reviewed plan of care, positioning. [facility] will cont to follow.

## 2021-05-21 NOTE — NUR
PT STILL BREATHING AT THIS TIME. O2 AT 91 % LAST CHECK AT 5L O2. RESP
PRESENTLY AT 12. STILL GURGLEY . MORPHINE SCAPOLAMINE PATCH AND ATROPINE DROPS
ALL USED TODAY. PT CONTINUES TO BE UNRESPONSIVE. DAUGHTER IN TO VISIT TODAY.
CONTINUE TO MONITOR COMFORT CARE PATIENT. PT QUIET, RESTING, NO GRIMMACES OR
DISTRESS NOTED. BED IN LOW POSITION, CALL LITE IN REACH, BED ALARM ON FOR
SAFEFTY

## 2021-05-21 NOTE — NUR
SHIFT SUMMARY: UNRESPONSIVE TO VERBAL STIMULI, DOES MOAN EVERY NOW AND THEN.
OCCATIONALLY WILL GET EYES OPEN. BREATHING HAS OCCATIONAL APNEIC PERIODS.
SUCTION FOR SECREATIONS. ORAL CARE Q4. OCCATIONAL GURGLING. DOES NOT MOVE MUCH
ANYMORE. REPOSITION Q2. CATH PATENT, REDDISH ORANGE IN COLOR. COMFORT MEDS
GIVEN FOR PAIN AND AIR HUNGER. WILL CONTINUE TO MONITOR. BED ALARM ON.

## 2021-05-21 NOTE — NUR
pt non reponsive and eminent review of smptoms with nursing. lungs oarse and
breathing starting to change. Nurisng was in contact with family.

## 2021-05-21 NOTE — NUR
ASSUMED CARE. SUCTION PROVIDED, LARGE AMOUNT OF YELLOW THICH SECREATIONS.
UNRESPONSIVE. DOES NOT RESPOND TO THE SUCTION. RESP WITH APNEIC PERIODS. RESP
AVERAGE 14-16. SOME MOTTLEING TO BLE. DECREASE IN URINE OUTPUT. ABDOMIN VERY
DISTENDED. DOES NOT APPEAR TO BE IN PAIN. HE APPEARS CLOSE TO PASSING. WILL
MONITOR AND CONTINUE COMFORT TREATMENT.

## 2021-05-21 NOTE — NUR
PT RESTING, NO AWAKE, EYES CLOSED. NON-RESPONDANT. DID NOT REESPOKND
TO STERNAL RUB. IS COMFORT CARE. ZACARIAS, DID SUCTION. SCAPOLOMINE PATCH AND
DROPS ADMIN. O2 IN PLACE, 5L. RESP APPROX 16, UNLABORED, UNEVEN. H/R PRESENTLY
 95.  BED IN LOW POSITION, CALLLITE IN REACH, BED ALARM ON FOR SAFETY

## 2021-05-21 NOTE — NUR
Some family members arrived about 30 minutes ago. Women states she is the
mother of one of his daughters. The maged Jones is a really good friend.
He was very upset about Jose's condition and why he was on comfort care.
Explained to him the process and that he chose this. He did not understand why
we weren't providing treatment. Explained again. He decided to take a break
and leave. States he will be back.

## 2021-05-22 NOTE — NUR
CHECKED ON THE PATIENT, HE TOOK HE LAST BREATHS PASSING AT 0555. INFORMED DR. RECIO OF PASSING. CHARGE INFORMED. ATTEMPTED X2 TO GET AHOLD OF DAUGHTER
NO RESPONSE. UNABLE TO LEAVE MESSAGE AT THIS TIME. CNA PROVIDING AFTER CARE.
WILL CONTINUE TO ATTEMPT CONTACT OF FAMILY.

## 2021-05-22 NOTE — NUR
IBRAHIMA HAS BEEN NEEDING SUCTION EVERY HOUR. LARGE AMOUNTS OF YELLOW SECREATIONS
NOTED. RESP ARE DOWN TO 8-9 STILL UNLABORED WITH PERIODS OF APNEA. ORAL CARE
EVERY HOUR DUE TO MOUTH BREATHING. NO URINE NOTED IN THE CATHETER AND FEET ARE
COLD AND MOTTLING.

## 2021-05-22 NOTE — NUR
SHIFT SUMMARY: UNRESPONSIVE TO PAIN OR VERBAL STIMULI. NO URINE OUTPUT.
MOTTLING TO BLE, COOL TO TOUCH LOWER EXTREMITIES. DUSKY COLORED AND YELLOW.
REPOSITIONED Q2. ORAL CARE AND SUCTIONING ALMOST EVERY HOUR. ROXINAL GIVEN X1
SO FAR THIS SHIFT. OXYGEN AT 5 LITERS FOR COMFORT. BREATHING DECREASED TO 9-12
BREATHS WITH APNEIC PERIODS. HR AVERAGE 70'S. LUNGS CRACKLES T/O. VERY CLOSE
TO PASSING. WILL MONITOR TILL DAYSHIFT ARRIVES.